# Patient Record
Sex: MALE | URBAN - METROPOLITAN AREA
[De-identification: names, ages, dates, MRNs, and addresses within clinical notes are randomized per-mention and may not be internally consistent; named-entity substitution may affect disease eponyms.]

---

## 2022-12-06 ENCOUNTER — APPOINTMENT (RX ONLY)
Dept: URBAN - METROPOLITAN AREA CLINIC 94 | Facility: CLINIC | Age: 62
Setting detail: DERMATOLOGY
End: 2022-12-06

## 2022-12-06 DIAGNOSIS — I87.2 VENOUS INSUFFICIENCY (CHRONIC) (PERIPHERAL): ICD-10-CM

## 2022-12-06 PROCEDURE — ? CEAP CLASSIFICATION

## 2022-12-06 PROCEDURE — 99213 OFFICE O/P EST LOW 20 MIN: CPT

## 2022-12-06 PROCEDURE — ? VENOUS CLINICAL SEVERITY SCORE

## 2022-12-06 NOTE — HPI: VEIN EVALUATION
Do You Have A Family History Of Vein Disease?: no
Which Leg Is Worse?: Equally Affected
How Severe Is/Are Your Symptoms?: moderate
Is This A New Presentation, Or A Follow-Up?: Vein Evaluation
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

## 2022-12-06 NOTE — PROCEDURE: VENOUS CLINICAL SEVERITY SCORE
Left Leg Active Ulcers: 0- None
Include Right Vcss In Note: Yes
Left Leg Compression Therapy: 2- Wears elastic stocking most days
Left Leg Venous Edema: 3- Morning edema above ankle and requiring activity change or elevation
Right Leg Pigmentation: 1- Diffuse, but limited in area, and old (brown)
Right Leg Varicose Veins: 2- Multiple: GS varicose veins confined to calf or thigh
Left Leg Pain: 2- Daily, moderate activity limitation, occasional analgesics
Detail Level: Detailed

## 2022-12-06 NOTE — PROCEDURE: CEAP CLASSIFICATION
Initial Location - Left Leg: left leg
Initial Location - Right Leg: right leg
Detail Level: Detailed

## 2023-01-06 ENCOUNTER — APPOINTMENT (RX ONLY)
Dept: URBAN - METROPOLITAN AREA CLINIC 94 | Facility: CLINIC | Age: 63
Setting detail: DERMATOLOGY
End: 2023-01-06

## 2023-01-06 DIAGNOSIS — I87.2 VENOUS INSUFFICIENCY (CHRONIC) (PERIPHERAL): ICD-10-CM

## 2023-01-06 PROCEDURE — ? LOWER EXTREMITY DOPPLER US

## 2023-01-06 PROCEDURE — 93970 EXTREMITY STUDY: CPT

## 2023-01-06 NOTE — PROCEDURE: LOWER EXTREMITY DOPPLER US
Left Intraluminal Thrombus- Yes: The left deep veins were imaged from the level of the common femoral vein to the posterior tibial veins. There was evidence of intraluminal thrombus as noted above.
Intraluminal Thrombus: No
Add Milliseconds Of Reflux To Note?: Yes
Reflux Options: Use Range
Comments: See attachment.
Right Intraluminal Thrombus- No: The right deep veins were imaged from the level of the common femoral vein to the posterior tibial veins. All deep veins demonstrated compressibility without evidence of intraluminal thrombus.
See Attached Documentation Text: Please refer to the attached ultrasound documentation for complete details of the procedure and the venous findings.
Left Intraluminal Thrombus- No: The left deep veins were imaged from the level of the common femoral vein to the posterior tibial veins. All deep veins demonstrated compressibility without evidence of intraluminal thrombus.
Detail Level: Detailed
Right Intraluminal Thrombus- Yes: The right deep veins were imaged from the level of the common femoral vein to the posterior tibial veins. There was evidence of intraluminal thrombus as noted above.
Continue Conservative Therapy Text: Continue conservative treatment (such as compression stockings, OTC analgesics, and exercise) and consider intervention if no change or worsening symptoms to varicosities.

## 2023-01-10 ENCOUNTER — APPOINTMENT (RX ONLY)
Dept: URBAN - METROPOLITAN AREA CLINIC 94 | Facility: CLINIC | Age: 63
Setting detail: DERMATOLOGY
End: 2023-01-10

## 2023-01-10 DIAGNOSIS — I87.2 VENOUS INSUFFICIENCY (CHRONIC) (PERIPHERAL): ICD-10-CM

## 2023-01-10 PROCEDURE — ? COUNSELING

## 2023-01-10 PROCEDURE — 99212 OFFICE O/P EST SF 10 MIN: CPT
